# Patient Record
Sex: FEMALE | URBAN - METROPOLITAN AREA
[De-identification: names, ages, dates, MRNs, and addresses within clinical notes are randomized per-mention and may not be internally consistent; named-entity substitution may affect disease eponyms.]

---

## 2024-10-09 ENCOUNTER — APPOINTMENT (RX ONLY)
Dept: URBAN - METROPOLITAN AREA CLINIC 46 | Facility: CLINIC | Age: 59
Setting detail: DERMATOLOGY
End: 2024-10-09

## 2024-10-09 DIAGNOSIS — L91.8 OTHER HYPERTROPHIC DISORDERS OF THE SKIN: ICD-10-CM

## 2024-10-09 DIAGNOSIS — D16 BENIGN NEOPLASM OF BONE AND ARTICULAR CARTILAGE: ICD-10-CM

## 2024-10-09 PROBLEM — D48.5 NEOPLASM OF UNCERTAIN BEHAVIOR OF SKIN: Status: ACTIVE | Noted: 2024-10-09

## 2024-10-09 PROCEDURE — ? DEFER

## 2024-10-09 PROCEDURE — ? COUNSELING

## 2024-10-09 PROCEDURE — ? ORDER ULTRASOUND

## 2024-10-09 PROCEDURE — 99204 OFFICE O/P NEW MOD 45 MIN: CPT

## 2024-10-09 PROCEDURE — ? PRESCRIPTION MEDICATION MANAGEMENT

## 2024-10-09 PROCEDURE — ? ADDITIONAL NOTES

## 2024-10-09 ASSESSMENT — LOCATION DETAILED DESCRIPTION DERM: LOCATION DETAILED: LEFT CRUS OF HELIX

## 2024-10-09 ASSESSMENT — LOCATION ZONE DERM: LOCATION ZONE: EAR

## 2024-10-09 ASSESSMENT — LOCATION SIMPLE DESCRIPTION DERM: LOCATION SIMPLE: LEFT EAR

## 2024-10-09 NOTE — PROCEDURE: ORDER ULTRASOUND
Other Ultrasound Protocol Name: right labia
Subcutaneous Lesion Ultrasound Reason: R/O: Osteoma
Other Ultrasound Protocol Reason: Epidermal cyst
Ultrasound Protocol: Ultrasound of Subcutaneous Mass
Skin Lesion Ultrasound Reason: Cyst vs lipoma
Priority: normal
Provider: Federica Peña PA-C
Breast Ultrasound Reason: Cyst
Detail Level: Simple

## 2024-10-09 NOTE — PROCEDURE: DEFER
[Fever] : no fever
[Chills] : no chills
[Feeling Fatigued] : feeling fatigued
[Joint Pain] : joint pain
[Joint Stiffness] : joint stiffness
[Myalgia] : myalgia
X Size Of Lesion In Cm (Optional): 0
[Negative] : Gastrointestinal
Introduction Text (Please End With A Colon): Excision with Ronnell, left preauricular;
[FreeTextEntry5] : see HPI
Procedure To Be Performed At Next Visit: Excision
[FreeTextEntry6] : see HPI
Detail Level: Simple
[de-identified] : see HPI